# Patient Record
Sex: MALE | Race: WHITE | NOT HISPANIC OR LATINO | Employment: FULL TIME | URBAN - METROPOLITAN AREA
[De-identification: names, ages, dates, MRNs, and addresses within clinical notes are randomized per-mention and may not be internally consistent; named-entity substitution may affect disease eponyms.]

---

## 2018-02-28 ENCOUNTER — OFFICE VISIT (OUTPATIENT)
Dept: URGENT CARE | Facility: URGENT CARE | Age: 18
End: 2018-02-28
Payer: COMMERCIAL

## 2018-02-28 VITALS
SYSTOLIC BLOOD PRESSURE: 108 MMHG | TEMPERATURE: 98.2 F | HEART RATE: 64 BPM | DIASTOLIC BLOOD PRESSURE: 62 MMHG | WEIGHT: 250 LBS | OXYGEN SATURATION: 98 %

## 2018-02-28 DIAGNOSIS — L30.9 DERMATITIS: Primary | ICD-10-CM

## 2018-02-28 PROCEDURE — 99203 OFFICE O/P NEW LOW 30 MIN: CPT | Performed by: FAMILY MEDICINE

## 2018-02-28 RX ORDER — TRIAMCINOLONE ACETONIDE 1 MG/G
OINTMENT TOPICAL
Qty: 60 G | Refills: 3 | Status: SHIPPED | OUTPATIENT
Start: 2018-02-28 | End: 2024-02-02

## 2018-02-28 NOTE — MR AVS SNAPSHOT
"              After Visit Summary   2018    Jose Ramon Thompson    MRN: [id number]           Patient Information     Date Of Birth          2000        Visit Information        Provider Department      2018 7:25 PM Herb Us MD [facility]        Today's Diagnoses     Dermatitis    -  1      Care Instructions    follow up with the primary care provider if not better in 2 weeks.           Follow-ups after your visit        Who to contact     If you have questions or need follow up information about today's clinic visit or your schedule please contact [facility] directly at [phone].  Normal or non-critical lab and imaging results will be communicated to you by TribeHRhart, letter or phone within 4 business days after the clinic has received the results. If you do not hear from us within 7 days, please contact the clinic through TribeHRhart or phone. If you have a critical or abnormal lab result, we will notify you by phone as soon as possible.  Submit refill requests through Kinesio Capture or call your pharmacy and they will forward the refill request to us. Please allow 3 business days for your refill to be completed.          Additional Information About Your Visit        MyChart Information     K[employer] Capture lets you send messages to your doctor, view your test results, renew your prescriptions, schedule appointments and more. To sign up, go to [email or web address] . Click on \"Log in\" on the left side of the screen, which will take you to the Welcome page. Then click on \"Sign up Now\" on the right side of the page.     You will be asked to enter the access code listed below, as well as some personal information. Please follow the directions to create your username and password.     Your access code is: [email or web address]  Expires: 2018  7:56 PM     Your access code will  in 90 days. If you need help or a new code, please call your [place] clinic or [phone].        Care " EveryWhere ID     This is your Care EveryWhere ID. This could be used by other organizations to access your [place] medical records  [id number]        Your Vitals Were     Pulse Temperature Pulse Oximetry             64 98.2  F (36.8  C) (Tympanic) 98%          Blood Pressure from Last 3 Encounters:   02/28/18 108/62   07/22/11 120/52    Weight from Last 3 Encounters:   02/28/18 250 lb (113.4 kg) (>99 %)*   05/08/14 249 lb (112.9 kg) (>99 %)*   07/22/11 201 lb (91.2 kg) (>99 %)*     * Growth percentiles are based on [facility] 2-20 Years data.              Today, you had the following     No orders found for display         Today's Medication Changes          These changes are accurate as of 2/28/18  7:56 PM.  If you have any questions, ask your nurse or doctor.               Start taking these medicines.        Dose/Directions    triamcinolone 0.1 % ointment   Commonly known as:  KENALOG   Used for:  Dermatitis   Started by:  Herb Us MD        Apply sparingly to the elbows three times daily.  Do not exceed 14 days of use.   Quantity:  60 g   Refills:  3            Where to get your medicines      These medications were sent to [id number]  [place], [place] - [address]  [address] [place] [address]     Phone:  [phone]     triamcinolone 0.1 % ointment                Primary Care Provider    Elma Cosby       No address on file        Equal Access to Services     ZAINAB BRAVO : Hadii aad ku hadasho Soomaali, waaxda luqadaha, qaybta kaalmada adeegyada, waxay dm ortega . So [facility] [phone].    ATENCIÓN: Si habla español, tiene a singleton disposición servicios gratuitos de asistencia lingüística. Llame al [phone].    We comply with applicable federal civil rights laws and [place] laws. We do not discriminate on the basis of race, color, national origin, age, disability, sex, sexual orientation, or gender identity.             Thank you!     Thank you for choosing [facility]  for your care. Our goal is always to provide you with excellent care. Hearing back from our patients is one way we can continue to improve our services. Please take a few minutes to complete the written survey that you may receive in the mail after your visit with us. Thank you!             Your Updated Medication List - Protect others around you: Learn how to safely use, store and throw away your medicines at www.disposemymeds.org.          This list is accurate as of 2/28/18  7:56 PM.  Always use your most recent med list.                   Brand Name Dispense Instructions for use Diagnosis    triamcinolone 0.1 % ointment    KENALOG    60 g    Apply sparingly to the elbows three times daily.  Do not exceed 14 days of use.    Dermatitis

## 2018-03-01 NOTE — NURSING NOTE
Chief Complaint   Patient presents with     Urgent Care     Rash on both elbows x1-2weeks. Patient feels like it is spreading and is itchy but not painful. Patient has not had any new food, medication or topical body products. Patient had posion ivy last july on lower abdomen (patient is wondering if it could possibly be related)       Initial /62 (BP Location: Right arm, Patient Position: Sitting, Cuff Size: Adult Regular)  Pulse 64  Temp 98.2  F (36.8  C) (Tympanic)  Wt 250 lb (113.4 kg)  SpO2 98% There is no height or weight on file to calculate BMI.  Medication Reconciliation: incomplete   Shelley Nazario CMA (AAMA)

## 2018-03-01 NOTE — PROGRESS NOTES
SUBJECTIVE:  Jose Ramon Thompson is a 17 year old male who presents to the clinic today for spreading, itchy red blotchy rash on the elbows (lateral aspect).  Onset of rash was 1-2 weeks ago.   Rash is worsening..  .  Quality/symptoms of rash: itching, redness.  No blisters.  No pus.     Symptoms are still present and rash seems to be worsening.    Recent exposure history: none.  No new soaps/clothes.  Patient was in [place] for a convention.      Associated symptoms include: nothing.    Past medical history:    Poison Ivy rash on the forearms and at the thighs, abdomen in late-June 2017.      No current outpatient prescriptions on file.     Social History   Substance Use Topics     Smoking status: Never Smoker     Smokeless tobacco: Never Used     Alcohol use Not on file       ROS:  Review of systems negative except as stated above.    EXAM:   /62 (BP Location: Right arm, Patient Position: Sitting, Cuff Size: Adult Regular)  Pulse 64  Temp 98.2  F (36.8  C) (Tympanic)  Wt 250 lb (113.4 kg)  SpO2 98%  GENERAL: alert, no acute distress.  SKIN: Rash description:    Distribution: localized  Location: bilateral elbows (lateral epicondyle areas)    Color: red,  Lesion type: closely spaced red papules.  No vesicles/crusting/pustules.  No annular lesions.,  No scaling. .     ASSESSMENT:  Dermatitis of the elbows    PLAN:  1) Rx:  Triamcinolone 0.1% ointment  2) Follow-up with primary clinic if not improving in 2 weeks.      Herb Us MD

## 2019-04-11 ENCOUNTER — TELEPHONE (OUTPATIENT)
Dept: PEDIATRICS | Facility: CLINIC | Age: 19
End: 2019-04-11

## 2019-04-18 ENCOUNTER — OFFICE VISIT (OUTPATIENT)
Dept: PEDIATRICS | Facility: CLINIC | Age: 19
End: 2019-04-18
Payer: COMMERCIAL

## 2019-04-18 VITALS
HEIGHT: 71 IN | SYSTOLIC BLOOD PRESSURE: 116 MMHG | WEIGHT: 234.5 LBS | TEMPERATURE: 98.1 F | DIASTOLIC BLOOD PRESSURE: 68 MMHG | RESPIRATION RATE: 16 BRPM | BODY MASS INDEX: 32.83 KG/M2 | OXYGEN SATURATION: 96 % | HEART RATE: 64 BPM

## 2019-04-18 DIAGNOSIS — R41.840 ATTENTION DISTURBANCE: Primary | ICD-10-CM

## 2019-04-18 PROCEDURE — 99213 OFFICE O/P EST LOW 20 MIN: CPT | Performed by: INTERNAL MEDICINE

## 2019-04-18 ASSESSMENT — MIFFLIN-ST. JEOR: SCORE: 2090.82

## 2019-04-18 NOTE — PATIENT INSTRUCTIONS
Let's hold on any ADHD meds; we could consider something like strattera, if your grades are affected.    Work on limiitng distractions:  Cell phone, music, people, windows.    Consider other ways of learning the information:  Listening or watching rather than reading.    Adrian Guerrero MD  Internal Medicine and Pediatrics

## 2019-07-21 ENCOUNTER — OFFICE VISIT (OUTPATIENT)
Dept: URGENT CARE | Facility: URGENT CARE | Age: 19
End: 2019-07-21
Payer: COMMERCIAL

## 2019-07-21 VITALS
OXYGEN SATURATION: 97 % | BODY MASS INDEX: 33.54 KG/M2 | WEIGHT: 239.6 LBS | HEIGHT: 71 IN | TEMPERATURE: 98.4 F | DIASTOLIC BLOOD PRESSURE: 58 MMHG | HEART RATE: 60 BPM | SYSTOLIC BLOOD PRESSURE: 108 MMHG

## 2019-07-21 DIAGNOSIS — L03.115 CELLULITIS OF RIGHT LOWER EXTREMITY: Primary | ICD-10-CM

## 2019-07-21 DIAGNOSIS — L24.7 IRRITANT CONTACT DERMATITIS DUE TO PLANT: ICD-10-CM

## 2019-07-21 PROCEDURE — 99213 OFFICE O/P EST LOW 20 MIN: CPT | Performed by: FAMILY MEDICINE

## 2019-07-21 RX ORDER — CEPHALEXIN 500 MG/1
500 CAPSULE ORAL 3 TIMES DAILY
Qty: 21 CAPSULE | Refills: 0 | Status: SHIPPED | OUTPATIENT
Start: 2019-07-21 | End: 2019-07-28

## 2019-07-21 RX ORDER — FLUOCINONIDE 0.5 MG/G
OINTMENT TOPICAL 3 TIMES DAILY
Qty: 30 G | Refills: 1 | Status: SHIPPED | OUTPATIENT
Start: 2019-07-21 | End: 2024-02-02

## 2019-07-21 ASSESSMENT — MIFFLIN-ST. JEOR: SCORE: 2113.95

## 2019-07-21 NOTE — PATIENT INSTRUCTIONS
follow up with your primary care provider if not better in 7-10 days.      Sarna Lotion or Calamine Lotion for the itching    Aveeno Oatmeal Baths for itch relief.      Try to avoid popping the blisters

## 2019-07-21 NOTE — PROGRESS NOTES
"SUBJECTIVE:   Jose Ramon Thompson is a 19 year old male presenting with a chief complaint of redness, some pus, itching at the right medial calf for the past 7 days.  An insect may have bitten the patient's left calf one week ago.    Onset of symptoms was 6 days ago.  [place] but did not see plants like poison ivy/poison oak.      Course of illness is improving (especially the itching).   No red streaks.       Current Outpatient Medications   Medication Sig Dispense Refill     triamcinolone (KENALOG) 0.1 % ointment Apply sparingly to the elbows three times daily.  Do not exceed 14 days of use. (Patient not taking: Reported on 4/18/2019) 60 g 3     Social History     Tobacco Use     Smoking status: Never Smoker     Smokeless tobacco: Never Used   Substance Use Topics     Alcohol use: Yes     Comment: 1-2 Drinks a Week        ROS:  INTEGUMENTARY/SKIN: POSITIVE for a rash at the right medial calf    OBJECTIVE:  /58   Pulse 60   Temp 98.4  F (36.9  C) (Tympanic)   Ht 1.803 m (5' 11\")   Wt 108.7 kg (239 lb 9.6 oz)   SpO2 97%   BMI 33.42 kg/m    GENERAL APPEARANCE: healthy, alert and no distress  Extremities: right anterior-medial shin has a circular area of confluent erythema with overlying, multiple, partially fused vesicles containing yellow clear fluid.      ASSESSMENT:  Contact dermatitis, most likely secondary to oils of a plant  Cellulitis (possible)    PLAN:  Rx:  Lidex ointment.    Rx:  Cephalexin  follow up with the primary care provider if not better in 7-10 days.   Sarna Lotion or Calamine Lotion for the itching  Aveeno Oatmeal Baths for itch relief    Herb Us MD    "

## 2023-04-24 ENCOUNTER — OFFICE VISIT (OUTPATIENT)
Dept: PEDIATRICS | Facility: CLINIC | Age: 23
End: 2023-04-24
Payer: COMMERCIAL

## 2023-04-24 VITALS
TEMPERATURE: 98.2 F | BODY MASS INDEX: 42.24 KG/M2 | HEIGHT: 71 IN | WEIGHT: 301.7 LBS | HEART RATE: 85 BPM | DIASTOLIC BLOOD PRESSURE: 64 MMHG | OXYGEN SATURATION: 96 % | SYSTOLIC BLOOD PRESSURE: 122 MMHG | RESPIRATION RATE: 18 BRPM

## 2023-04-24 DIAGNOSIS — Z11.4 SCREENING FOR HIV (HUMAN IMMUNODEFICIENCY VIRUS): ICD-10-CM

## 2023-04-24 DIAGNOSIS — Z00.00 ROUTINE GENERAL MEDICAL EXAMINATION AT A HEALTH CARE FACILITY: ICD-10-CM

## 2023-04-24 DIAGNOSIS — E66.01 MORBID OBESITY (H): Primary | ICD-10-CM

## 2023-04-24 DIAGNOSIS — Z11.59 NEED FOR HEPATITIS C SCREENING TEST: ICD-10-CM

## 2023-04-24 DIAGNOSIS — R06.83 SNORING: ICD-10-CM

## 2023-04-24 DIAGNOSIS — B36.0 TINEA VERSICOLOR: ICD-10-CM

## 2023-04-24 LAB
ALBUMIN SERPL BCG-MCNC: 4.4 G/DL (ref 3.5–5.2)
ALP SERPL-CCNC: 96 U/L (ref 40–129)
ALT SERPL W P-5'-P-CCNC: 55 U/L (ref 10–50)
ANION GAP SERPL CALCULATED.3IONS-SCNC: 12 MMOL/L (ref 7–15)
AST SERPL W P-5'-P-CCNC: 53 U/L (ref 10–50)
BILIRUB SERPL-MCNC: 0.4 MG/DL
BUN SERPL-MCNC: 10.1 MG/DL (ref 6–20)
CALCIUM SERPL-MCNC: 9.2 MG/DL (ref 8.6–10)
CHLORIDE SERPL-SCNC: 102 MMOL/L (ref 98–107)
CHOLEST SERPL-MCNC: 155 MG/DL
CREAT SERPL-MCNC: 0.92 MG/DL (ref 0.67–1.17)
DEPRECATED HCO3 PLAS-SCNC: 25 MMOL/L (ref 22–29)
GFR SERPL CREATININE-BSD FRML MDRD: >90 ML/MIN/1.73M2
GLUCOSE SERPL-MCNC: 84 MG/DL (ref 70–99)
HBA1C MFR BLD: 4.7 % (ref 0–5.6)
HCV AB SERPL QL IA: NONREACTIVE
HDLC SERPL-MCNC: 36 MG/DL
HIV 1+2 AB+HIV1 P24 AG SERPL QL IA: NONREACTIVE
LDLC SERPL CALC-MCNC: 108 MG/DL
NONHDLC SERPL-MCNC: 119 MG/DL
POTASSIUM SERPL-SCNC: 4.2 MMOL/L (ref 3.4–5.3)
PROT SERPL-MCNC: 7.1 G/DL (ref 6.4–8.3)
SODIUM SERPL-SCNC: 139 MMOL/L (ref 136–145)
TRIGL SERPL-MCNC: 57 MG/DL

## 2023-04-24 PROCEDURE — 99213 OFFICE O/P EST LOW 20 MIN: CPT | Mod: 25 | Performed by: INTERNAL MEDICINE

## 2023-04-24 PROCEDURE — 87389 HIV-1 AG W/HIV-1&-2 AB AG IA: CPT | Performed by: INTERNAL MEDICINE

## 2023-04-24 PROCEDURE — 36415 COLL VENOUS BLD VENIPUNCTURE: CPT | Performed by: INTERNAL MEDICINE

## 2023-04-24 PROCEDURE — 83036 HEMOGLOBIN GLYCOSYLATED A1C: CPT | Performed by: INTERNAL MEDICINE

## 2023-04-24 PROCEDURE — 86803 HEPATITIS C AB TEST: CPT | Performed by: INTERNAL MEDICINE

## 2023-04-24 PROCEDURE — 80061 LIPID PANEL: CPT | Performed by: INTERNAL MEDICINE

## 2023-04-24 PROCEDURE — 90471 IMMUNIZATION ADMIN: CPT | Performed by: INTERNAL MEDICINE

## 2023-04-24 PROCEDURE — 80053 COMPREHEN METABOLIC PANEL: CPT | Performed by: INTERNAL MEDICINE

## 2023-04-24 PROCEDURE — 90651 9VHPV VACCINE 2/3 DOSE IM: CPT | Performed by: INTERNAL MEDICINE

## 2023-04-24 PROCEDURE — 99385 PREV VISIT NEW AGE 18-39: CPT | Mod: 25 | Performed by: INTERNAL MEDICINE

## 2023-04-24 RX ORDER — FLUCONAZOLE 150 MG/1
300 TABLET ORAL WEEKLY
Qty: 4 TABLET | Refills: 0 | Status: SHIPPED | OUTPATIENT
Start: 2023-04-24 | End: 2023-05-02

## 2023-04-24 SDOH — ECONOMIC STABILITY: INCOME INSECURITY: IN THE LAST 12 MONTHS, WAS THERE A TIME WHEN YOU WERE NOT ABLE TO PAY THE MORTGAGE OR RENT ON TIME?: NO

## 2023-04-24 SDOH — ECONOMIC STABILITY: TRANSPORTATION INSECURITY
IN THE PAST 12 MONTHS, HAS THE LACK OF TRANSPORTATION KEPT YOU FROM MEDICAL APPOINTMENTS OR FROM GETTING MEDICATIONS?: NO

## 2023-04-24 SDOH — ECONOMIC STABILITY: TRANSPORTATION INSECURITY
IN THE PAST 12 MONTHS, HAS LACK OF TRANSPORTATION KEPT YOU FROM MEETINGS, WORK, OR FROM GETTING THINGS NEEDED FOR DAILY LIVING?: NO

## 2023-04-24 SDOH — ECONOMIC STABILITY: FOOD INSECURITY: WITHIN THE PAST 12 MONTHS, THE FOOD YOU BOUGHT JUST DIDN'T LAST AND YOU DIDN'T HAVE MONEY TO GET MORE.: NEVER TRUE

## 2023-04-24 SDOH — HEALTH STABILITY: PHYSICAL HEALTH: ON AVERAGE, HOW MANY MINUTES DO YOU ENGAGE IN EXERCISE AT THIS LEVEL?: 60 MIN

## 2023-04-24 SDOH — ECONOMIC STABILITY: INCOME INSECURITY: HOW HARD IS IT FOR YOU TO PAY FOR THE VERY BASICS LIKE FOOD, HOUSING, MEDICAL CARE, AND HEATING?: NOT VERY HARD

## 2023-04-24 SDOH — ECONOMIC STABILITY: FOOD INSECURITY: WITHIN THE PAST 12 MONTHS, YOU WORRIED THAT YOUR FOOD WOULD RUN OUT BEFORE YOU GOT MONEY TO BUY MORE.: NEVER TRUE

## 2023-04-24 SDOH — HEALTH STABILITY: PHYSICAL HEALTH: ON AVERAGE, HOW MANY DAYS PER WEEK DO YOU ENGAGE IN MODERATE TO STRENUOUS EXERCISE (LIKE A BRISK WALK)?: 7 DAYS

## 2023-04-24 ASSESSMENT — ENCOUNTER SYMPTOMS
JOINT SWELLING: 0
FREQUENCY: 0
DYSURIA: 0
SHORTNESS OF BREATH: 0
HEADACHES: 0
HEMATOCHEZIA: 0
CHILLS: 0
DIARRHEA: 0
NAUSEA: 0
COUGH: 0
DIZZINESS: 0
HEMATURIA: 0
HEARTBURN: 0
ABDOMINAL PAIN: 0
FEVER: 0
PARESTHESIAS: 0
SORE THROAT: 0
ARTHRALGIAS: 0
MYALGIAS: 0
CONSTIPATION: 0
WEAKNESS: 0
PALPITATIONS: 0
EYE PAIN: 0
NERVOUS/ANXIOUS: 0

## 2023-04-24 ASSESSMENT — SOCIAL DETERMINANTS OF HEALTH (SDOH)
DO YOU BELONG TO ANY CLUBS OR ORGANIZATIONS SUCH AS CHURCH GROUPS UNIONS, FRATERNAL OR ATHLETIC GROUPS, OR SCHOOL GROUPS?: NO
HOW OFTEN DO YOU GET TOGETHER WITH FRIENDS OR RELATIVES?: THREE TIMES A WEEK
ARE YOU MARRIED, WIDOWED, DIVORCED, SEPARATED, NEVER MARRIED, OR LIVING WITH A PARTNER?: LIVING WITH PARTNER
IN A TYPICAL WEEK, HOW MANY TIMES DO YOU TALK ON THE PHONE WITH FAMILY, FRIENDS, OR NEIGHBORS?: MORE THAN THREE TIMES A WEEK
HOW OFTEN DO YOU ATTEND CHURCH OR RELIGIOUS SERVICES?: NEVER

## 2023-04-24 ASSESSMENT — LIFESTYLE VARIABLES
HOW OFTEN DO YOU HAVE SIX OR MORE DRINKS ON ONE OCCASION: MONTHLY
AUDIT-C TOTAL SCORE: 6
HOW MANY STANDARD DRINKS CONTAINING ALCOHOL DO YOU HAVE ON A TYPICAL DAY: 3 OR 4
SKIP TO QUESTIONS 9-10: 0
HOW OFTEN DO YOU HAVE A DRINK CONTAINING ALCOHOL: 2-3 TIMES A WEEK

## 2023-04-24 ASSESSMENT — PAIN SCALES - GENERAL: PAINLEVEL: NO PAIN (0)

## 2023-04-24 NOTE — PATIENT INSTRUCTIONS
Try to limit complex carbs (rice, bread, pasta, potatoes) and simple carbs (pop, juice, alcohol.)  Eating more lean proteins (chicken, fish, eggs, beans, nuts) and unlimited vegetables and fruits can definitely help as well.      Goal weight:  250.      With respect to snoring:  weight loss, try flonase, breathe rite strips, side sleeping.     With respect to the rash:  try diflucan 300 mg weekly for 2 doses.    HPV shot today.    Lab work today:  We can do labs in the exam room today, or you can get them done downstairs in the lab.          Preventive Health Recommendations  Male Ages 21 - 25     Yearly exam:             See your health care provider every year in order to  o   Review health changes.   o   Discuss preventive care.    o   Review your medicines if your doctor has prescribed any.  You should be tested each year for STDs (sexually transmitted diseases).   Talk to your provider about cholesterol testing.    If you are at risk for diabetes, you should have a diabetes test (fasting glucose).    Shots: Get a flu shot each year. Get a tetanus shot every 10 years.     Nutrition:  Eat at least 5 servings of fruits and vegetables daily.   Eat whole-grain bread, whole-wheat pasta and brown rice instead of white grains and rice.   Get adequate calcium and Vitamin D.     Lifestyle  Exercise for at least 150 minutes a week (30 minutes a day, 5 days a week). This will help you control your weight and prevent disease.   Limit alcohol to one drink per day.   No smoking.   Wear sunscreen to prevent skin cancer.   See your dentist every six months for an exam and cleaning.

## 2023-04-24 NOTE — PROGRESS NOTES
SUBJECTIVE:   CC: Jose Ramon is an 23 year old who presents for preventative health visit.       4/24/2023     7:51 AM   Additional Questions   Roomed by MANOHAR Fernandes   Accompanied by Self   Patient has been advised of split billing requirements and indicates understanding: Yes  Healthy Habits:     Getting at least 3 servings of Calcium per day:  Yes    Bi-annual eye exam:  NO    Dental care twice a year:  NO    Sleep apnea or symptoms of sleep apnea:  Excessive snoring    Diet:  Regular (no restrictions)    Frequency of exercise:  6-7 days/week    Duration of exercise:  Greater than 60 minutes    Taking medications regularly:  Yes    Medication side effects:  Not applicable and None    PHQ-2 Total Score: 0    Additional concerns today:  Yes    Dad with high blood pressure and cholesterol.      Mom with some cardiac issues: SVT and Vtach, s/p cardioversion A fib.     3-4 times over last 1 year, after eating lunch, felt jittery and felt almost like was going to pass out. Occurrs once every 3 months, lasts 20 min, improves after eating.      Also with some post nasal drip, with caffeine.      Also, he has additional complaints of snoring; using mouth tape and this improves things; has gained 60 pounds in last 4 years.    Some blotchiness to skin.  No itching or pain.    Hoping to get diabetes blood test (A1c), CMP, and LDL cholesterol.    Today's PHQ-2 Score:       4/24/2023     7:38 AM   PHQ-2 ( 1999 [employer])   Q1: Little interest or pleasure in doing things 0   Q2: Feeling down, depressed or hopeless 0   PHQ-2 Score 0   Q1: Little interest or pleasure in doing things Not at all   Q2: Feeling down, depressed or hopeless Not at all   PHQ-2 Score 0       Have you ever done Advance Care Planning? (For example, a Health Directive, POLST, or a discussion with a medical provider or your loved ones about your wishes): No, advance care planning information given to patient to review.  Patient declined advance care planning  discussion at this time.    Social History     Tobacco Use     Smoking status: Never     Passive exposure: Never     Smokeless tobacco: Never   Vaping Use     Vaping status: Never Used     Passive vaping exposure: Yes   Substance Use Topics     Alcohol use: Yes     Comment: 1-2 Drinks a Week              4/24/2023     7:37 AM   Alcohol Use   Prescreen: >3 drinks/day or >7 drinks/week? No          View : No data to display.                Last PSA: No results found for: PSA    Reviewed orders with patient. Reviewed health maintenance and updated orders accordingly - Yes  Lab work is in process  Labs reviewed in EPIC  BP Readings from Last 3 Encounters:   04/24/23 122/64   07/21/19 108/58   04/18/19 116/68    Wt Readings from Last 3 Encounters:   04/24/23 136.9 kg (301 lb 11.2 oz)   07/21/19 108.7 kg (239 lb 9.6 oz)   04/18/19 106.4 kg (234 lb 8 oz)                  There is no problem list on file for this patient.    No past surgical history on file.    Social History     Tobacco Use     Smoking status: Never     Passive exposure: Never     Smokeless tobacco: Never   Vaping Use     Vaping status: Never Used     Passive vaping exposure: Yes   Substance Use Topics     Alcohol use: Yes     Comment: 1-2 Drinks a Week      Family History   Problem Relation Age of Onset     Atrial fibrillation Mother      Hypertension Father      Hyperlipidemia Father      No Known Problems Brother         dyslexia, ADHD.         Current Outpatient Medications   Medication Sig Dispense Refill     fluocinonide (LIDEX) 0.05 % external ointment Apply topically 3 times daily Do not exceed 14 days of use. (Patient not taking: Reported on 4/24/2023) 30 g 1     triamcinolone (KENALOG) 0.1 % ointment Apply sparingly to the elbows three times daily.  Do not exceed 14 days of use. (Patient not taking: Reported on 4/18/2019) 60 g 3     Allergies   Allergen Reactions     Nickel Dermatitis     Leaves imprint on skin, irritation        Reviewed and  "updated as needed this visit by clinical staff   Tobacco  Allergies  Meds              Reviewed and updated as needed this visit by Provider                   No past medical history on file.   No past surgical history on file.    Review of Systems   Constitutional: Negative for chills and fever.   HENT: Negative for congestion, ear pain, hearing loss and sore throat.    Eyes: Negative for pain and visual disturbance.   Respiratory: Negative for cough and shortness of breath.    Cardiovascular: Negative for chest pain, palpitations and peripheral edema.   Gastrointestinal: Negative for abdominal pain, constipation, diarrhea, heartburn, hematochezia and nausea.   Genitourinary: Negative for dysuria, frequency, genital sores, hematuria, impotence, penile discharge and urgency.   Musculoskeletal: Negative for arthralgias, joint swelling and myalgias.   Skin: Negative for rash.   Neurological: Negative for dizziness, weakness, headaches and paresthesias.   Psychiatric/Behavioral: Negative for mood changes. The patient is not nervous/anxious.      CONSTITUTIONAL: NEGATIVE for fever, chills, change in weight  INTEGUMENTARY/SKIN: NEGATIVE for worrisome rashes, moles or lesions  EYES: NEGATIVE for vision changes or irritation  ENT: NEGATIVE for ear, mouth and throat problems  RESP: NEGATIVE for significant cough or SOB  CV: NEGATIVE for chest pain, palpitations or peripheral edema  GI: NEGATIVE for nausea, abdominal pain, heartburn, or change in bowel habits   male: negative for dysuria, hematuria, decreased urinary stream, erectile dysfunction, urethral discharge  MUSCULOSKELETAL: NEGATIVE for significant arthralgias or myalgia  NEURO: NEGATIVE for weakness, dizziness or paresthesias  PSYCHIATRIC: NEGATIVE for changes in mood or affect    OBJECTIVE:   /64   Pulse 85   Temp 98.2  F (36.8  C) (Tympanic)   Resp 18   Ht 1.795 m (5' 10.67\")   Wt 136.9 kg (301 lb 11.2 oz)   SpO2 96%   BMI 42.47 kg/m  "     Physical Exam  GENERAL: healthy, alert and no distress  EYES: Eyes grossly normal to inspection, PERRL and conjunctivae and sclerae normal  HENT: ear canals and TM's normal, nose and mouth without ulcers or lesions  NECK: no adenopathy, no asymmetry, masses, or scars and thyroid normal to palpation  RESP: lungs clear to auscultation - no rales, rhonchi or wheezes  CV: regular rate and rhythm, normal S1 S2, no S3 or S4, no murmur, click or rub, no peripheral edema and peripheral pulses strong  ABDOMEN: soft, nontender, no hepatosplenomegaly, no masses and bowel sounds normal   (male): normal male genitalia without lesions or urethral discharge, no hernia  MS: no gross musculoskeletal defects noted, no edema  SKIN: tinea versicolor on neck and beneath breasts.   NEURO: Normal strength and tone, mentation intact and speech normal  PSYCH: mentation appears normal, affect normal/bright    Diagnostic Test Results:  Labs reviewed in Epic    ASSESSMENT/PLAN:   (E66.01) Morbid obesity (H)  (primary encounter diagnosis)  Comment:   Plan: weight loss discussed.     (Z11.4) Screening for HIV (human immunodeficiency virus)  Comment:   Plan: HIV Antigen Antibody Combo            (Z11.59) Need for hepatitis C screening test  Comment:   Plan: Hepatitis C Screen Reflex to HCV RNA Quant and         Genotype            (Z00.00) Routine general medical examination at a health care facility  Comment:   Plan: Lipid panel reflex to direct LDL Fasting,         Hemoglobin A1c, Comprehensive metabolic panel         (BMP + Alb, Alk Phos, ALT, AST, Total. Bili,         TP)        Discussed diet, exercise, testicular self exam, blood pressure, cholesterol, and need for cancer surveillance at appropriate ages.     (R06.83) Snoring  Comment:   Plan: weight loss, side sleeping, flonase, breathe-rit.     (B36.0) Tinea versicolor  Comment:   Plan: fluconazole (DIFLUCAN) 150 MG tablet        Trial of this.       Patient has been advised of  "split billing requirements and indicates understanding: Yes      COUNSELING:   Reviewed preventive health counseling, as reflected in patient instructions       Regular exercise       Healthy diet/nutrition       Vision screening       Hearing screening      BMI:   Estimated body mass index is 42.47 kg/m  as calculated from the following:    Height as of this encounter: 1.795 m (5' 10.67\").    Weight as of this encounter: 136.9 kg (301 lb 11.2 oz).   Weight management plan: Patient was referred to their PCP to discuss a diet and exercise plan.      He reports that he has never smoked. He has never been exposed to tobacco smoke. He has never used smokeless tobacco.            Adrian Guerrero MD  [facility] [place]  "

## 2023-04-24 NOTE — LETTER
April 25, 2023      Jose Ramon Thompson  [address] [place] [address]        Jose Ramon,       Agustin diabetes blood test (A1c), LDL cholesterol, electrolytes, liver, and kidney panels, HIV, and Hep C all looked within normal limits.       Hope you're well.       Adrian Guerrero MD   Internal Medicine and Pediatrics     Resulted Orders   HIV Antigen Antibody Combo   Result Value Ref Range    HIV Antigen Antibody Combo Nonreactive Nonreactive      Comment:      HIV-1 p24 Ag & HIV-1/HIV-2 Ab Not Detected   Hepatitis C Screen Reflex to HCV RNA Quant and Genotype   Result Value Ref Range    Hepatitis C Antibody Nonreactive Nonreactive    Narrative    Assay performance characteristics have not been established for newborns, infants, and children.   Lipid panel reflex to direct LDL Fasting   Result Value Ref Range    Cholesterol 155 <200 mg/dL    Triglycerides 57 <150 mg/dL    Direct Measure HDL 36 (L) >=40 mg/dL    LDL Cholesterol Calculated 108 (H) <=100 mg/dL    Non HDL Cholesterol 119 <130 mg/dL    Narrative    Cholesterol  Desirable:  <200 mg/dL    Triglycerides  Normal:  Less than 150 mg/dL  Borderline High:  150-199 mg/dL  High:  200-499 mg/dL  Very High:  Greater than or equal to 500 mg/dL    Direct Measure HDL  Female:  Greater than or equal to 50 mg/dL   Male:  Greater than or equal to 40 mg/dL    LDL Cholesterol  Desirable:  <100mg/dL  Above Desirable:  100-129 mg/dL   Borderline High:  130-159 mg/dL   High:  160-189 mg/dL   Very High:  >= 190 mg/dL    Non HDL Cholesterol  Desirable:  130 mg/dL  Above Desirable:  130-159 mg/dL  Borderline High:  160-189 mg/dL  High:  190-219 mg/dL  Very High:  Greater than or equal to 220 mg/dL   Hemoglobin A1c   Result Value Ref Range    Hemoglobin A1C 4.7 0.0 - 5.6 %      Comment:      Normal <5.7%   Prediabetes 5.7-6.4%    Diabetes 6.5% or higher     Note: Adopted from ADA consensus guidelines.   Comprehensive metabolic panel (BMP + Alb, Alk Phos, ALT, AST, Total. Bili,  TP)   Result Value Ref Range    Sodium 139 136 - 145 mmol/L    Potassium 4.2 3.4 - 5.3 mmol/L    Chloride 102 98 - 107 mmol/L    Carbon Dioxide (CO2) 25 22 - 29 mmol/L    Anion Gap 12 7 - 15 mmol/L    Urea Nitrogen 10.1 6.0 - 20.0 mg/dL    Creatinine 0.92 0.67 - 1.17 mg/dL    Calcium 9.2 8.6 - 10.0 mg/dL    Glucose 84 70 - 99 mg/dL    Alkaline Phosphatase 96 40 - 129 U/L    AST 53 (H) 10 - 50 U/L    ALT 55 (H) 10 - 50 U/L    Protein Total 7.1 6.4 - 8.3 g/dL    Albumin 4.4 3.5 - 5.2 g/dL    Bilirubin Total 0.4 <=1.2 mg/dL    GFR Estimate >90 >60 mL/min/1.73m2      Comment:      eGFR calculated using 2021 CKD-EPI equation.

## 2023-06-27 DIAGNOSIS — L65.9 HAIR LOSS: Primary | ICD-10-CM

## 2024-01-15 ENCOUNTER — TELEPHONE (OUTPATIENT)
Dept: PEDIATRICS | Facility: CLINIC | Age: 24
End: 2024-01-15
Payer: COMMERCIAL

## 2024-01-15 ENCOUNTER — DOCUMENTATION ONLY (OUTPATIENT)
Dept: LAB | Facility: CLINIC | Age: 24
End: 2024-01-15
Payer: COMMERCIAL

## 2024-01-15 NOTE — PROGRESS NOTES
Attempt #1 to reach patient regarding the below provider note. Left VM to call back and speak to a triage nurse.  Pedro Pozo RN on 1/15/2024 at 12:42 PM

## 2024-01-15 NOTE — PROGRESS NOTES
"Attempt #2 to reach pt.    Per chart review:  \"Mabel Barba MA 1/15/24 12:58 PM Note  Pt Said he's switching to [place]. Has his appt Taken care of.   Mabel Barba MA.   \"      Pt has lab appt 1/16, and virtual visit 1/19 with [facility] provider. No active lab orders see at this time- please review.     Thanks!  Pedro Pozo RN on 1/15/2024 at 4:08 PM    "

## 2024-01-15 NOTE — PROGRESS NOTES
Please call pt to inquire about his lab appointment.  Did he schedule this in advance of his annual physical with Dr. Guerrero (I.e. was he looking to repeat his preventive labs that were drawn las year) or was there something specific he needed tested?    Sally Sagastume MD  Covering for Dr. Guerrero

## 2024-01-15 NOTE — PROGRESS NOTES
Jose Ramon MEJIA Donna has an upcoming lab appointment:    Future Appointments   Date Time Provider Department Center   1/16/2024  7:30 AM [facility]   4/25/2024  7:00 AM Adrian Guerrero MD [facility]     Patient is scheduled for the following lab(s):     There is no order available. Please review and place either future orders or HMPO (Review of Health Maintenance Protocol Orders), as appropriate.    Health Maintenance Due   Topic    ANNUAL REVIEW OF HM ORDERS      Tabatha Woodson

## 2024-01-16 ENCOUNTER — LAB (OUTPATIENT)
Dept: LAB | Facility: CLINIC | Age: 24
End: 2024-01-16
Payer: COMMERCIAL

## 2024-01-16 ENCOUNTER — DOCUMENTATION ONLY (OUTPATIENT)
Dept: FAMILY MEDICINE | Facility: CLINIC | Age: 24
End: 2024-01-16

## 2024-01-16 LAB
HOLD SPECIMEN: NORMAL

## 2024-01-16 NOTE — PROGRESS NOTES
Pt has a visit with you on 1/19/24.  He had a list of tests he would like done=    CBC  A1C  CMP  LIPID  LFT  TSH      I drew JIC tubes, please review and order.

## 2024-01-16 NOTE — PROGRESS NOTES
Let him know he can do his labs during his visit that new provider wants done  BOO Jean, CNP

## 2024-01-16 NOTE — PROGRESS NOTES
I have never seen patient before and won't order labs on a patient I've never seen. Many of his requested labs are not indicated as they were normal less than a year ago. I would suggest he either reach out to his PCP to see if he'd like to order labs on the blood that has been already been drawn OR he wait until his visit with me to discuss and need to return for a redraw.     Yolette Lainez PA-C

## 2024-01-16 NOTE — PROGRESS NOTES
No labs due in care gaps. Are there any labs you would request? Per message below, patient is requesting:   CBC  A1C  CMP  LIPID  LFT  TSH    Braden ALFARO RN 1/16/2024 at 9:22 AM

## 2024-01-16 NOTE — PROGRESS NOTES
Pt has a visit coming up with you. He has a lab appt today 1/16/24. Please review chart and place lab orders.

## 2024-01-16 NOTE — PROGRESS NOTES
Patient was called and notified.  He is planning on switching clinics and providers but understands that MARIAN Muniz cannot order labs until she sees him.  He agreed to have message sent to Dr. Guerrero to see if previous PCP can order the labs.  Please have your reach out to patient to let him know either way    Josie Santana RN  [facility]

## 2024-02-02 ENCOUNTER — OFFICE VISIT (OUTPATIENT)
Dept: FAMILY MEDICINE | Facility: CLINIC | Age: 24
End: 2024-02-02
Payer: COMMERCIAL

## 2024-02-02 VITALS
SYSTOLIC BLOOD PRESSURE: 132 MMHG | DIASTOLIC BLOOD PRESSURE: 83 MMHG | BODY MASS INDEX: 40.8 KG/M2 | RESPIRATION RATE: 16 BRPM | OXYGEN SATURATION: 96 % | HEART RATE: 85 BPM | WEIGHT: 289.8 LBS | TEMPERATURE: 98 F

## 2024-02-02 DIAGNOSIS — D72.819 LEUKOPENIA, UNSPECIFIED TYPE: ICD-10-CM

## 2024-02-02 DIAGNOSIS — R06.83 SNORING: ICD-10-CM

## 2024-02-02 DIAGNOSIS — Z13.6 ENCOUNTER FOR LIPID SCREENING FOR CARDIOVASCULAR DISEASE: ICD-10-CM

## 2024-02-02 DIAGNOSIS — Z13.1 SCREENING FOR DIABETES MELLITUS: ICD-10-CM

## 2024-02-02 DIAGNOSIS — Z13.220 ENCOUNTER FOR LIPID SCREENING FOR CARDIOVASCULAR DISEASE: ICD-10-CM

## 2024-02-02 DIAGNOSIS — Z23 NEED FOR VACCINATION: ICD-10-CM

## 2024-02-02 PROBLEM — E55.9 VITAMIN D DEFICIENCY: Status: ACTIVE | Noted: 2019-01-08

## 2024-02-02 PROBLEM — J30.89 NON-SEASONAL ALLERGIC RHINITIS: Status: ACTIVE | Noted: 2019-01-08

## 2024-02-02 LAB
ALBUMIN SERPL BCG-MCNC: 4.2 G/DL (ref 3.5–5.2)
ALP SERPL-CCNC: 94 U/L (ref 40–150)
ALT SERPL W P-5'-P-CCNC: 35 U/L (ref 0–70)
ANION GAP SERPL CALCULATED.3IONS-SCNC: 8 MMOL/L (ref 7–15)
AST SERPL W P-5'-P-CCNC: 30 U/L (ref 0–45)
BILIRUB SERPL-MCNC: 0.2 MG/DL
BUN SERPL-MCNC: 12.4 MG/DL (ref 6–20)
CALCIUM SERPL-MCNC: 9.4 MG/DL (ref 8.6–10)
CHLORIDE SERPL-SCNC: 105 MMOL/L (ref 98–107)
CHOLEST SERPL-MCNC: 140 MG/DL
CREAT SERPL-MCNC: 0.92 MG/DL (ref 0.67–1.17)
DEPRECATED HCO3 PLAS-SCNC: 27 MMOL/L (ref 22–29)
EGFRCR SERPLBLD CKD-EPI 2021: >90 ML/MIN/1.73M2
ERYTHROCYTE [DISTWIDTH] IN BLOOD BY AUTOMATED COUNT: 12 % (ref 10–15)
FASTING STATUS PATIENT QL REPORTED: YES
GLUCOSE SERPL-MCNC: 92 MG/DL (ref 70–99)
HBA1C MFR BLD: 5.1 % (ref 0–5.6)
HCT VFR BLD AUTO: 43.7 % (ref 40–53)
HDLC SERPL-MCNC: 25 MG/DL
HGB BLD-MCNC: 14.5 G/DL (ref 13.3–17.7)
HOLD SPECIMEN: NORMAL
LDLC SERPL CALC-MCNC: 105 MG/DL
MCH RBC QN AUTO: 27.4 PG (ref 26.5–33)
MCHC RBC AUTO-ENTMCNC: 33.2 G/DL (ref 31.5–36.5)
MCV RBC AUTO: 83 FL (ref 78–100)
NONHDLC SERPL-MCNC: 115 MG/DL
PLATELET # BLD AUTO: 499 10E3/UL (ref 150–450)
POTASSIUM SERPL-SCNC: 6 MMOL/L (ref 3.4–5.3)
PROT SERPL-MCNC: 7.5 G/DL (ref 6.4–8.3)
RBC # BLD AUTO: 5.3 10E6/UL (ref 4.4–5.9)
SODIUM SERPL-SCNC: 140 MMOL/L (ref 135–145)
TRIGL SERPL-MCNC: 52 MG/DL
TSH SERPL DL<=0.005 MIU/L-ACNC: 2.89 UIU/ML (ref 0.3–4.2)
WBC # BLD AUTO: 7.2 10E3/UL (ref 4–11)

## 2024-02-02 PROCEDURE — 90651 9VHPV VACCINE 2/3 DOSE IM: CPT | Performed by: FAMILY MEDICINE

## 2024-02-02 PROCEDURE — 36415 COLL VENOUS BLD VENIPUNCTURE: CPT | Performed by: FAMILY MEDICINE

## 2024-02-02 PROCEDURE — 99213 OFFICE O/P EST LOW 20 MIN: CPT | Mod: 25 | Performed by: FAMILY MEDICINE

## 2024-02-02 PROCEDURE — 80061 LIPID PANEL: CPT | Performed by: FAMILY MEDICINE

## 2024-02-02 PROCEDURE — 90471 IMMUNIZATION ADMIN: CPT | Performed by: FAMILY MEDICINE

## 2024-02-02 PROCEDURE — 83036 HEMOGLOBIN GLYCOSYLATED A1C: CPT | Performed by: FAMILY MEDICINE

## 2024-02-02 PROCEDURE — 80053 COMPREHEN METABOLIC PANEL: CPT | Performed by: FAMILY MEDICINE

## 2024-02-02 PROCEDURE — 84443 ASSAY THYROID STIM HORMONE: CPT | Performed by: FAMILY MEDICINE

## 2024-02-02 PROCEDURE — 85027 COMPLETE CBC AUTOMATED: CPT | Performed by: FAMILY MEDICINE

## 2024-02-02 NOTE — PROGRESS NOTES
"  Assessment & Plan       ICD-10-CM    1. BMI 40.0-44.9, adult (H)  Z68.41 REVIEW OF HEALTH MAINTENANCE PROTOCOL ORDERS     TSH with free T4 reflex     Lipid panel reflex to direct LDL Fasting     Hemoglobin A1c     Comprehensive metabolic panel     CBC with Platelets and Reflex to Iron Studies      2. Snoring  R06.83 REVIEW OF HEALTH MAINTENANCE PROTOCOL ORDERS     TSH with free T4 reflex     Lipid panel reflex to direct LDL Fasting     Hemoglobin A1c     Comprehensive metabolic panel     CBC with Platelets and Reflex to Iron Studies      3. Encounter for lipid screening for cardiovascular disease  Z13.220 Lipid panel reflex to direct LDL Fasting    Z13.6       4. Leukopenia, unspecified type  D72.819 TSH with free T4 reflex     CBC with Platelets and Reflex to Iron Studies      5. Need for vaccination  Z23       6. Screening for diabetes mellitus  Z13.1 Hemoglobin A1c            Review of external notes as documented elsewhere in note        BMI  Estimated body mass index is 40.8 kg/m  as calculated from the following:    Height as of 4/24/23: 1.795 m (5' 10.67\").    Weight as of this encounter: 131.5 kg (289 lb 12.8 oz).   Weight management plan: Discussed healthy diet and exercise guidelines      There are no Patient Instructions on file for this visit.    Huyen Albright is a 23 year old, presenting for the following health issues:  Blood Draw (TSH, Lipid, CMP, A1C, CBC and liver function)        2/2/2024     7:50 AM   Additional Questions   Roomed by Kristi   Accompanied by none         2/2/2024     7:50 AM   Patient Reported Additional Medications   Patient reports taking the following new medications none     History of Present Illness       Reason for visit:  Worried about thyroid.  Symptom onset:  More than a month  Symptoms include:  Tightness at bottom of throat. Doesnt impede breathing.  Symptom intensity:  Mild  Symptom progression:  Staying the same  Had these symptoms before:  No  What makes it " worse:  No.  What makes it better:  No.    He eats 2-3 servings of fruits and vegetables daily.He consumes 0 sweetened beverage(s) daily.He exercises with enough effort to increase his heart rate 30 to 60 minutes per day.  He exercises with enough effort to increase his heart rate 4 days per week.   He is taking medications regularly.     Fullness at base of neck  No issues with swallowing  No associations  Possibly family history of thyroid issue    Requests basic labs                Review of Systems  Constitutional, HEENT, cardiovascular, pulmonary, gi and gu systems are negative, except as otherwise noted.      Objective    /83   Pulse 85   Temp 98  F (36.7  C) (Temporal)   Resp 16   Wt 131.5 kg (289 lb 12.8 oz)   SpO2 96%   BMI 40.80 kg/m    Body mass index is 40.8 kg/m .  Physical Exam  Constitutional:       General: He is not in acute distress.     Appearance: Normal appearance. He is well-developed. He is not ill-appearing.   HENT:      Head: Normocephalic and atraumatic.      Right Ear: External ear normal.      Left Ear: External ear normal.      Nose: Nose normal.   Eyes:      General: No scleral icterus.     Extraocular Movements: Extraocular movements intact.      Conjunctiva/sclera: Conjunctivae normal.   Neck:      Comments: Nonpalpable thyroid  Cardiovascular:      Rate and Rhythm: Normal rate.   Pulmonary:      Effort: Pulmonary effort is normal.   Musculoskeletal:      Cervical back: Normal range of motion and neck supple. No tenderness.   Skin:     General: Skin is warm and dry.   Neurological:      Mental Status: He is alert and oriented to person, place, and time.   Psychiatric:         Behavior: Behavior normal.         Thought Content: Thought content normal.         Judgment: Judgment normal.                    Signed Electronically by: Jevon Dunn MD

## 2024-02-13 DIAGNOSIS — E87.5 HYPERKALEMIA: Primary | ICD-10-CM

## 2024-06-02 ENCOUNTER — HEALTH MAINTENANCE LETTER (OUTPATIENT)
Age: 24
End: 2024-06-02

## 2025-06-14 ENCOUNTER — HEALTH MAINTENANCE LETTER (OUTPATIENT)
Age: 25
End: 2025-06-14